# Patient Record
Sex: FEMALE | Race: WHITE | NOT HISPANIC OR LATINO | URBAN - METROPOLITAN AREA
[De-identification: names, ages, dates, MRNs, and addresses within clinical notes are randomized per-mention and may not be internally consistent; named-entity substitution may affect disease eponyms.]

---

## 2025-03-05 ASSESSMENT — ENCOUNTER SYMPTOMS
ADENOPATHY: 0
WEAKNESS: 0
SHORTNESS OF BREATH: 0
SPEECH DIFFICULTY: 0
SORE THROAT: 0
NAUSEA: 0
VOMITING: 0
FLANK PAIN: 0
ENDOCRINE NEGATIVE: 1
DYSURIA: 0
CONFUSION: 0
EYE DISCHARGE: 0
HEADACHES: 0
HALLUCINATIONS: 0
ABDOMINAL PAIN: 0

## 2025-03-05 NOTE — PROGRESS NOTES
Subjective     HPI  Yazan Nair is a 79 y.o. female who is referred for colorectal liver metastasis.  Her cancer history is as follows.  Laparoscopic/robotic sigmoid colectomy in January 2020 for stage I well-differentiated adenocarcinoma with negative lymph nodes.  No microsatellite instability.  No adjuvant chemotherapy.  In May 2022 routine colonoscopy demonstrated tumor in her transverse colon separate from her prior site.  She underwent robotic converted to open right colectomy with final staging being 3B (T3 N1b with 3 of 37 positive lymph nodes).  She was treated with adjuvant Xeloda for approximately 4 months.  CEA elevated to 22 recently and imaging with CT scan that I reviewed shows a left lateral section dominant lesion.  PET scan was performed which showed 2 lesions in the left lateral section and in retrospect in looking at the CT scan the second lesion is apparent.  Also on my review of her CT scan, there is evidence of portal venous hypertension with dilated portal veins as well as splenomegaly.  One of the lesions which I do not have coronal image of seems to extend greater than 3 cm and a vertical fashion although I do not have the exact measurement of this.  The second tumor is abutting the left portal vein on its left side.  PET scan also mentions some indeterminate lesions in the chest the patient notes a history of tuberculosis in the past.  Physiologic changes down in the rectum, but colonoscopy January 2025 normal.  Patient notes that biopsy confirmed metastatic colon cancer to liver.    PMH - autoimmune hepatitis diagnosed in 2016 initially treated with steroids and currently on mercaptopurine, portal hypertension with likely early cirrhosis with the patient noting FibroScan showing fibrotic liver GERD, HTN, hypothyroid, hyperlipidemia, TB in the past    PSH -laparoscopic cholecystitis in 2016, colon cancer operations as above, 1993 hysterectomy    Review of Systems   Constitutional:   Positive for appetite change. Negative for unexpected weight change.   HENT:  Negative for ear discharge, nosebleeds and sore throat.    Eyes:  Negative for discharge.   Respiratory:  Negative for shortness of breath.    Cardiovascular:  Negative for chest pain.   Gastrointestinal:  Negative for abdominal pain, nausea and vomiting.   Endocrine: Negative.    Genitourinary:  Negative for dysuria and flank pain.   Musculoskeletal:  Negative for gait problem.   Skin:  Negative for rash.   Neurological:  Negative for speech difficulty, weakness and headaches.   Hematological:  Negative for adenopathy.   Psychiatric/Behavioral:  Negative for behavioral problems, confusion and hallucinations.         Past Medical History:   Diagnosis Date    Personal history of other diseases of the circulatory system     History of hypertension      Past Surgical History:   Procedure Laterality Date    CHOLECYSTECTOMY  11/06/2019    Cholecystectomy Laparoscopic    COLONOSCOPY  11/06/2019    Complete Colonoscopy    HYSTERECTOMY  11/06/2019    Hysterectomy    INCISIONAL BREAST BIOPSY  06/13/2016    Incisional Breast Biopsy    OTHER SURGICAL HISTORY  01/27/2020    Laparoscopic partial colectomy      No current outpatient medications on file prior to visit.     No current facility-administered medications on file prior to visit.      Not on File   Social History     Socioeconomic History    Marital status:      Spouse name: Not on file    Number of children: Not on file    Years of education: Not on file    Highest education level: Not on file   Occupational History    Not on file   Tobacco Use    Smoking status: Not on file    Smokeless tobacco: Not on file   Substance and Sexual Activity    Alcohol use: Not on file    Drug use: Not on file    Sexual activity: Not on file   Other Topics Concern    Not on file   Social History Narrative    Not on file     Social Drivers of Health     Financial Resource Strain: Not on file   Food  Insecurity: Not on file   Transportation Needs: Not on file   Physical Activity: Not on file   Stress: Not on file   Social Connections: Not on file   Intimate Partner Violence: Not on file   Housing Stability: Not on file      No family history on file.       Objective     Vitals:    03/06/25 0938   BP: 128/74   Pulse: 100   Resp: 16   Temp: 36.3 °C (97.3 °F)   SpO2: 95%          Physical Exam  Constitutional:       Appearance: Normal appearance.   HENT:      Head: Atraumatic.      Nose: Nose normal.   Eyes:      Extraocular Movements: Extraocular movements intact.      Conjunctiva/sclera: Conjunctivae normal.   Cardiovascular:      Rate and Rhythm: Normal rate.   Pulmonary:      Effort: Pulmonary effort is normal.   Abdominal:      Palpations: Abdomen is soft.      Tenderness: There is no abdominal tenderness.      Hernia: A hernia (Reducible at periumbilical region) is present.   Musculoskeletal:         General: Normal range of motion.   Skin:     Findings: No rash.   Neurological:      General: No focal deficit present.      Mental Status: She is alert.   Psychiatric:         Behavior: Behavior normal.          Assessment/Plan     79-year-old woman with colorectal liver metastases with 2 lesions in the left lateral section of the liver one of the lesions abutting the main left portal vein.  She also has portal hypertension.  I discussed the current situation with her and that surgery in the anatomic setting right now would require left hepatectomy.  I worry about this operation given the condition of her liver.  Additionally, if we can get some shrinkage of the tumor, she may become a candidate for tumor ablations, possibly even percutaneously, or a candidate for a more minor hepatectomy.  At this point, I recommend neoadjuvant chemotherapy potentially with FOLFOX and repeat imaging i after about 2 to 3 months with MRI liver and CT scan of the chest abdomen and pelvis.  I will reassess her at that time to  determine what liver directed therapies would be available to her at that time.  She notes that she would not want to undergo left hepatectomy but would be open to ablation techniques or possibly even a more minor hepatectomy which in her case would need to be performed in an open fashion.    Leo Phipps MD

## 2025-03-06 ENCOUNTER — OFFICE VISIT (OUTPATIENT)
Dept: SURGICAL ONCOLOGY | Facility: CLINIC | Age: 80
End: 2025-03-06
Payer: MEDICARE

## 2025-03-06 VITALS
HEART RATE: 100 BPM | SYSTOLIC BLOOD PRESSURE: 128 MMHG | DIASTOLIC BLOOD PRESSURE: 74 MMHG | WEIGHT: 130.29 LBS | TEMPERATURE: 97.3 F | OXYGEN SATURATION: 95 % | BODY MASS INDEX: 22.02 KG/M2 | RESPIRATION RATE: 16 BRPM

## 2025-03-06 DIAGNOSIS — C78.7 COLON CANCER METASTASIZED TO LIVER (MULTI): Primary | ICD-10-CM

## 2025-03-06 DIAGNOSIS — C18.9 COLON CANCER METASTASIZED TO LIVER (MULTI): Primary | ICD-10-CM

## 2025-03-06 PROCEDURE — 99215 OFFICE O/P EST HI 40 MIN: CPT | Performed by: SURGERY

## 2025-03-06 PROCEDURE — 99205 OFFICE O/P NEW HI 60 MIN: CPT | Performed by: SURGERY

## 2025-03-06 PROCEDURE — 1159F MED LIST DOCD IN RCRD: CPT | Performed by: SURGERY

## 2025-03-06 PROCEDURE — 1036F TOBACCO NON-USER: CPT | Performed by: SURGERY

## 2025-03-06 PROCEDURE — 1126F AMNT PAIN NOTED NONE PRSNT: CPT | Performed by: SURGERY

## 2025-03-06 RX ORDER — AMOXICILLIN 500 MG/1
CAPSULE ORAL
COMMUNITY
Start: 2024-10-21

## 2025-03-06 RX ORDER — ATORVASTATIN CALCIUM 20 MG/1
20 TABLET, FILM COATED ORAL
COMMUNITY

## 2025-03-06 RX ORDER — LOPERAMIDE HCL 2 MG
2 TABLET ORAL 3 TIMES DAILY PRN
COMMUNITY
Start: 2025-02-25

## 2025-03-06 RX ORDER — PANTOPRAZOLE SODIUM 40 MG/1
40 TABLET, DELAYED RELEASE ORAL
COMMUNITY
Start: 2025-02-24

## 2025-03-06 RX ORDER — LEVOTHYROXINE SODIUM 25 UG/1
25 TABLET ORAL
COMMUNITY
Start: 2023-09-20

## 2025-03-06 RX ORDER — VIT C/E/ZN/COPPR/LUTEIN/ZEAXAN 250MG-90MG
500 CAPSULE ORAL
COMMUNITY
Start: 2024-01-10

## 2025-03-06 RX ORDER — ALENDRONATE SODIUM 70 MG/1
70 TABLET ORAL
COMMUNITY

## 2025-03-06 RX ORDER — AMLODIPINE BESYLATE 5 MG/1
5 TABLET ORAL DAILY
COMMUNITY

## 2025-03-06 RX ORDER — ONDANSETRON 4 MG/1
4 TABLET, FILM COATED ORAL EVERY 8 HOURS PRN
COMMUNITY
Start: 2025-02-25

## 2025-03-06 RX ORDER — MERCAPTOPURINE 50 MG/1
50 TABLET ORAL
COMMUNITY

## 2025-03-06 RX ORDER — LOSARTAN POTASSIUM 100 MG/1
100 TABLET ORAL DAILY
COMMUNITY

## 2025-03-06 ASSESSMENT — PATIENT HEALTH QUESTIONNAIRE - PHQ9
2. FEELING DOWN, DEPRESSED OR HOPELESS: NOT AT ALL
1. LITTLE INTEREST OR PLEASURE IN DOING THINGS: NOT AT ALL
SUM OF ALL RESPONSES TO PHQ9 QUESTIONS 1 AND 2: 0

## 2025-03-06 ASSESSMENT — PAIN SCALES - GENERAL: PAINLEVEL_OUTOF10: 0-NO PAIN

## 2025-03-06 ASSESSMENT — ENCOUNTER SYMPTOMS
UNEXPECTED WEIGHT CHANGE: 0
APPETITE CHANGE: 1
LOSS OF SENSATION IN FEET: 0
DEPRESSION: 0
OCCASIONAL FEELINGS OF UNSTEADINESS: 0

## 2025-03-06 ASSESSMENT — COLUMBIA-SUICIDE SEVERITY RATING SCALE - C-SSRS
6. HAVE YOU EVER DONE ANYTHING, STARTED TO DO ANYTHING, OR PREPARED TO DO ANYTHING TO END YOUR LIFE?: NO
1. IN THE PAST MONTH, HAVE YOU WISHED YOU WERE DEAD OR WISHED YOU COULD GO TO SLEEP AND NOT WAKE UP?: NO
2. HAVE YOU ACTUALLY HAD ANY THOUGHTS OF KILLING YOURSELF?: NO

## 2025-06-18 DIAGNOSIS — Z01.818 PRE-OP TESTING: ICD-10-CM

## 2025-06-18 DIAGNOSIS — R91.1 LUNG NODULE: Primary | ICD-10-CM

## 2025-06-18 NOTE — PROGRESS NOTES
Bronchoscopy Scheduling Request    Pre-bronchoscopy visit: New patient visit with Bronchoscopy group provider  Please schedule procedure: Next available  Post-bronchoscopy visit: 7 - 10 days after bronchoscopy with referring pulmonologist or pre-bronchoscopy visit provider      Cytology on-site:  Yes  Location:  [facility]  Performing physician:  Advanced diagnostic bronchoscopist  Referring physician:  Dave Enriquez MD, Jair Aguirre MD, Leo Phipps MD  Indication:  Left apical nodule 1.6 x 2.0 x 1.1 cm  Sedation / Anesthesia:  GA  Procedure:  Airway exam, TBNA, TBBx, Navigational bronchoscopy, Radial EBUS, Staging EBUS  Time:  Tier 3  Fluorscopy:   Yes  Imaging needed:  CT Te - same day as procedure  Labs:  CBC, BMP  Meds:  None  Special Considerations:  seems challenging to sample given location, recommend trial of illumisite, has colorectal cancer mets to liver  Reviewed by:  Crystal Durand MD

## 2025-07-08 ENCOUNTER — TELEPHONE (OUTPATIENT)
Dept: PULMONOLOGY | Facility: HOSPITAL | Age: 80
End: 2025-07-08
Payer: MEDICARE

## 2025-07-08 NOTE — TELEPHONE ENCOUNTER
Followed up with patient to get bronchoscopy scheduled. With disease progression she does not want to move forward with tests at this time.

## 2025-08-19 LAB — NON-UH HIE MISC SENDOUT: NORMAL
